# Patient Record
(demographics unavailable — no encounter records)

---

## 2025-07-08 NOTE — HISTORY OF PRESENT ILLNESS
[FreeTextEntry1] : Imaging reviewed. ECHO (11/2023):  EF >55%, Mild AI / MR NST (8/2020):  No ischemia  History of CAD s/p PCI LAD x 2 (4/2013), TIA, HTN, HLD.  Very active.  Large garden.  7/2025:  Feels well.  No CP, SOB or palpitations.

## 2025-07-08 NOTE — DISCUSSION/SUMMARY
[FreeTextEntry1] : CAD s/p PCI - Continue aspirin and Atorvastatin - Continue Metoprolol succinate 12.5 mg daily  HTN - Continue Amlodipine 10 mg daily  Follow-up 6 months

## 2025-07-08 NOTE — PHYSICAL EXAM
[Well Developed] : well developed [No Acute Distress] : no acute distress [No Carotid Bruit] : no carotid bruit [Normal S1, S2] : normal S1, S2 [No Murmur] : no murmur [Clear Lung Fields] : clear lung fields [Good Air Entry] : good air entry [No Respiratory Distress] : no respiratory distress  [Soft] : abdomen soft [Non Tender] : non-tender [No Edema] : no edema [No Rash] : no rash [Moves all extremities] : moves all extremities [No Focal Deficits] : no focal deficits [Alert and Oriented] : alert and oriented